# Patient Record
Sex: FEMALE | Race: WHITE | Employment: UNEMPLOYED | URBAN - NONMETROPOLITAN AREA
[De-identification: names, ages, dates, MRNs, and addresses within clinical notes are randomized per-mention and may not be internally consistent; named-entity substitution may affect disease eponyms.]

---

## 2020-11-02 ENCOUNTER — HOSPITAL ENCOUNTER (OUTPATIENT)
Dept: PHYSICAL THERAPY | Age: 30
Setting detail: THERAPIES SERIES
Discharge: HOME | End: 2020-11-02
Payer: COMMERCIAL

## 2020-11-02 PROCEDURE — 97035 APP MDLTY 1+ULTRASOUND EA 15: CPT

## 2020-11-02 PROCEDURE — G0283 ELEC STIM OTHER THAN WOUND: HCPCS

## 2020-11-02 PROCEDURE — 97161 PT EVAL LOW COMPLEX 20 MIN: CPT

## 2020-11-02 PROCEDURE — 97140 MANUAL THERAPY 1/> REGIONS: CPT

## 2020-11-02 NOTE — PROGRESS NOTES
Phone: Sandra           Fax: [phone]                           Outpatient Physical Therapy                                                                            Daily Note    Patient: Karis Nicole : 1990  CSN #: [id number]   Referring Practitioner:  Dr. 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 This patient has no significant PMHx however links her pain to the birth of her 2nd child. She had a very difficult birth and had multiple and very involved tears that required stitching. She states that following the delivery, the next day while she was still in the hospital they also found she still had some of the placenta in her belly that required removal. Then only months later she had her tubal and gall bladder removed. Pt states that the pain never leaves her and it ranges from 1 to 7/10. Pt always has the sensation of pressure and discomfort. Pt was referred for this issue and she was prescribed a topical medication which she started and a birth control to help with her menses. Pt notes that she has pain with attempt of any medical exam, tampon insertion, or sexual intercourse. Pt states she has pain with penetration as well as during. Pt also notes that with tampon insertion she has pain initially but also discomfort as it is in place. Pt states that during sex, she feels like her  is \"hitting a wall. 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 Pt has significant pain that interferes with daily activities. Score on Pain Disability Index is 27 at evaluation. Pt sybil LE ROM is limited and pt does note pain at end ranges. Due to parasympathetic overdrive and pain limiting, this patient is unable to tolerate palpation/assessment of the 3rd layer. Activity Tolerance   Good    Patient Education  Patient Education: Pt was educated in purchase of dilators and a current HEP. Also discussed PT POC  Pt verbalized/demonstrated good understanding:     [x] Yes         [] No, pt required further clarification. Barriers to Learning: none    Post Treatment Pain:  4-5/10      Plan  Times per week: 1  Plan weeks: 6      Goals  (Total # of Visits to Date: 1)      Short term goals  Time Frame for Short term goals: 3 weeks  Short term goal 1: Initiate HEP and progress as tolerated  Short term goal 2: Decrease pain with basic ADLs and mobility to better tolerate care for her children and household chores. Short term goal 3: Pt will initiate purchase of dilator set for HEP    Long term goals  Time Frame for Long term goals : 12 visits  Long term goal 1: Pt will report compliance with her HEP and be independent in progression  Long term goal 2: Pt will report at least a 50% reduction in pain and improved tolerance to sexual intercourse and medical exam.  Long term goal 3: Pt will be able to participate in recreational activites without an increase in baseline pain.     Minutes Tracking:  Time In: 1330  Time Out: [address] [address] 200  Minutes: 2200 [employer]  PT, DPT             Date: 11/2/2020

## 2020-11-02 NOTE — PLAN OF CARE
[facility]           Phone: [phone]             Outpatient Physical Therapy  Fax: [phone]                                           Date: 2020  Patient: Myra Ewing : 1990 CSN #: [id number]   Referring Practitioner:  Dr. 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 Pt has significant pain that interferes with daily activities. Score on Pain Disability Index is 27 at evaluation. Pt sybil LE ROM is limited and pt does note pain at end ranges. Due to parasympathetic overdrive and pain limiting, this patient is unable to tolerate palpation/assessment of the 3rd layer. Goals  Short term goals  Time Frame for Short term goals: 3 weeks  Short term goal 1: Initiate HEP and progress as tolerated  Short term goal 2: Decrease pain with basic ADLs and mobility to better tolerate care for her children and household chores. Short term goal 3: Pt will initiate purchase of dilator set for HEP  Long term goals  Time Frame for Long term goals : 12 visits  Long term goal 1: Pt will report compliance with her HEP and be independent in progression  Long term goal 2: Pt will report at least a 50% reduction in pain and improved tolerance to sexual intercourse and medical exam.  Long term goal 3: Pt will be able to participate in recreational activites without an increase in baseline pain.      Prognosis  Prognosis: Good    Treatment Plan   Times per week: 1  Plan weeks: 6  [x] HP/CP [x] Electrical Stim   [x] Therapeutic Exercise      [] Gait Training  [] Aquatics   [x] Ultrasound         [x] Patient Education/HEP   [x] Manual Therapy  [] Traction    [x] Neuro-deandre        [x] Soft Tissue Mobs            [] Home TENS  [] Iontophoresis    [] Orthotic casting/fitting      [] Dry Needling             Electronically signed by: Syeda Watkins PT, DPT    Date: 11/2/2020      ______________________________________ Date: 11/2/2020   Physician Signature

## 2020-11-02 NOTE — PROGRESS NOTES
Phone: [address] [facility]          Fax: [phone]                      Outpatient Physical Therapy                                                                      Evaluation  Date: 2020  Patient: Vale Gross  : 1990  CSN #: [id number]  Referring Practitioner: Dr. 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 This patient has no significant PMHx however links her pain to the birth of her 2nd child. She had a very difficult birth and had multiple and very involved tears that required stitching. She states that following the delivery, the next day while she was still in the hospital they also found she still had some of the placenta in her belly that required removal. Then only months later she had her tubal and gall bladder removed. Pt states that the pain never leaves her and it ranges from 1 to 7/10. Pt always has the sensation of pressure and discomfort. Pt was referred for this issue and she was prescribed a topical medication which she started and a birth control to help with her menses. Pt notes that she has pain with attempt of any medical exam, tampon insertion, or sexual intercourse. Pt states she has pain with penetration as well as during. Pt also notes that with tampon insertion she has pain initially but also discomfort as it is in place. Pt states that during sex, she feels like her  is \"hitting a wall. \"  Comments: Score on Pain Disability Index is 27 on Evaluation, 2020  Additional Pertinent Hx: Irritable Bowel Syndrome     Objective Observation/Palpation  Posture: Good    RLE General PROM: SLR 60 degrees, ER 40 degrees (+) pain lateral hip, IR 24 degrees (+) pain lateral hip, IT band WFL but (+) discomfort, hip flexor in prone (+) pain at hip flexor attachment  LLE General PROM: SLR 65 degrees, ER 51 degrees (+) pain lateral hip, IR 29 degrees (+) pain lateral hip, IT band WFL but (+) discomfort, hip flexor in prone (+) pain at hip flexor attachment    Functional Outcome Measures  See general comments    Assessment  Assessment: Pt was referred for Vulvodynia (N94.819), Myofascial Pain (M79.18) and PMHx of IBS. Pt has significant pain that interferes with daily activities. Score on Pain Disability Index is 27 at evaluation. Pt sybil LE ROM is limited and pt does note pain at end ranges. Due to parasympathetic overdrive and pain limiting, this patient is unable to tolerate palpation/assessment of the 3rd layer. Prognosis: Good        Decision Making: Low Complexity    Patient Education  Patient Education: Pt was educated in purchase of dilators and a current HEP. Also discussed PT POC  Pt verbalized/demonstrated good understanding:     [X] Yes         [] No, pt required further clarification. Goals  Short term goals  Time Frame for Short term goals: 3 weeks  Short term goal 1: Initiate HEP and progress as tolerated  Short term goal 2: Decrease pain with basic ADLs and mobility to better tolerate care for her children and household chores. Short term goal 3: Pt will initiate purchase of dilator set for HEP    Long term goals  Time Frame for Long term goals : 12 visits  Long term goal 1: Pt will report compliance with her HEP and be independent in progression  Long term goal 2: Pt will report at least a 50% reduction in pain and improved tolerance to sexual intercourse and medical exam.  Long term goal 3: Pt will be able to participate in recreational activites without an increase in baseline pain.       Patient goals : decrease pain and improve quality of life with her         Minutes Tracking:  Time In: 1330  Time Out: [address] [address] 200  Minutes: [address], PT, DPT                11/2/2020

## 2020-11-11 ENCOUNTER — HOSPITAL ENCOUNTER (OUTPATIENT)
Dept: PHYSICAL THERAPY | Age: 30
Setting detail: THERAPIES SERIES
Discharge: HOME | End: 2020-11-11
Payer: COMMERCIAL

## 2020-11-11 PROCEDURE — 97140 MANUAL THERAPY 1/> REGIONS: CPT

## 2020-11-11 PROCEDURE — G0283 ELEC STIM OTHER THAN WOUND: HCPCS

## 2020-11-11 PROCEDURE — 97035 APP MDLTY 1+ULTRASOUND EA 15: CPT

## 2020-11-11 NOTE — PROGRESS NOTES
Education  Patient Education: Pt given instructions on Piriformis and glut massage  Pt verbalized/demonstrated good understanding:     [x] Yes         [] No, pt required further clarification. Post Treatment Pain:  4/10      Plan  Times per week: 1  Plan weeks: 6      Goals  (Total # of Visits to Date: 2)      Short term goals  Time Frame for Short term goals: 3 weeks  Short term goal 1: Initiate HEP and progress as tolerated (MET)  Short term goal 2: Decrease pain with basic ADLs and mobility to better tolerate care for her children and household chores. Short term goal 3: Pt will initiate purchase of dilator set for HEP  (MET)    Long term goals  Time Frame for Long term goals : 12 visits  Long term goal 1: Pt will report compliance with her HEP and be independent in progression  Long term goal 2: Pt will report at least a 50% reduction in pain and improved tolerance to sexual intercourse and medical exam.  Long term goal 3: Pt will be able to participate in recreational activites without an increase in baseline pain.     Minutes Tracking:  Time In: 9255  Time Out: 189 ERYN Soria  Minutes: 78  Timed Code Treatment Minutes: 78 205 Lukasz Soria  PT, DPT      Date: 11/11/2020

## 2020-11-25 ENCOUNTER — HOSPITAL ENCOUNTER (OUTPATIENT)
Dept: PHYSICAL THERAPY | Age: 30
Setting detail: THERAPIES SERIES
Discharge: HOME | End: 2020-11-25
Payer: COMMERCIAL

## 2020-11-25 PROCEDURE — G0283 ELEC STIM OTHER THAN WOUND: HCPCS

## 2020-11-25 PROCEDURE — 97140 MANUAL THERAPY 1/> REGIONS: CPT

## 2020-11-25 PROCEDURE — 97035 APP MDLTY 1+ULTRASOUND EA 15: CPT

## 2020-12-09 ENCOUNTER — HOSPITAL ENCOUNTER (OUTPATIENT)
Dept: PHYSICAL THERAPY | Age: 30
Setting detail: THERAPIES SERIES
Discharge: HOME | End: 2020-12-09
Payer: COMMERCIAL

## 2020-12-09 PROCEDURE — 97035 APP MDLTY 1+ULTRASOUND EA 15: CPT

## 2020-12-09 PROCEDURE — 97140 MANUAL THERAPY 1/> REGIONS: CPT

## 2020-12-09 PROCEDURE — G0283 ELEC STIM OTHER THAN WOUND: HCPCS

## 2020-12-14 NOTE — PROGRESS NOTES
Phone: Sandra           Fax: [phone]                           Outpatient Physical Therapy                                                                            Daily Note    Patient: Judieth Sever : 1990  CSN #: [id number]   Referring Practitioner:  Dr. 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 She was actually surprised and her and her  were able to have intercourse. The pain was still there but was not as intense. Pain today is 2 to 3/10 and she feels 50% improved. Exercises:   HEP    Manual:  Other: Performed cupping to lower abdomen, low back, gluts, hamstrings, statically. IDN is not being utilized due to intolerance. Have informed the patient that as tolerated will also begin dynamic cupping. Modalities:  Moist heat: x15 min for pain and discomfort of PFM  Ultrasound: 1.5 W/cm2, 1 MHz, x8 min to external genitalia to decrease muscular tension  E-stim (parameters): IFC x15 min for pain and discomfort of PFM       Assessment  Assessment: Pt was able to tolerate digital release and palpation of the 1st and 2nd layer moving toward layer 3. Pt noted more tolerance also. Pt had decreased sensitivity, min to mod vs mod prior. Activity Tolerance  Activity Tolerance: Patient Tolerated treatment well    Patient Education  Patient Education: Discussed advancing to dynamic cupping. Pt understood  Pt verbalized/demonstrated good understanding:     [x] Yes         [] No, pt required further clarification.        Post Treatment Pain:  2-3/10      Plan  Times per week: 1  Plan weeks: 6      Goals  (Total # of Visits to Date: 4)      Short term goals  Time Frame for Short term goals: 3 weeks  Short term goal 1: Initiate HEP and progress as tolerated (MET)  Short term goal 2: Decrease pain with basic ADLs and mobility to better tolerate care for her children and household chores. Short term goal 3: Pt will initiate purchase of dilator set for HEP (MET)    Long term goals  Time Frame for Long term goals : 12 visits  Long term goal 1: Pt will report compliance with her HEP and be independent in progression  Long term goal 2: Pt will report at least a 50% reduction in pain and improved tolerance to sexual intercourse and medical exam.  Long term goal 3: Pt will be able to participate in recreational activites without an increase in baseline pain.     Minutes Tracking:  Time In: 0815  Time Out: [address] [facility]  Minutes: 73  Timed Code Treatment Minutes: 73 205 Lukasz Soria  PT, DPT       Date: 12/9/2020

## 2020-12-16 ENCOUNTER — APPOINTMENT (OUTPATIENT)
Dept: PHYSICAL THERAPY | Age: 30
End: 2020-12-16
Payer: COMMERCIAL

## 2020-12-23 ENCOUNTER — HOSPITAL ENCOUNTER (OUTPATIENT)
Dept: PHYSICAL THERAPY | Age: 30
Setting detail: THERAPIES SERIES
Discharge: HOME | End: 2020-12-23
Payer: COMMERCIAL

## 2020-12-23 PROCEDURE — 97035 APP MDLTY 1+ULTRASOUND EA 15: CPT

## 2020-12-23 PROCEDURE — 97140 MANUAL THERAPY 1/> REGIONS: CPT

## 2020-12-23 PROCEDURE — G0283 ELEC STIM OTHER THAN WOUND: HCPCS

## 2020-12-31 NOTE — PROGRESS NOTES
Phone: Sandra           Fax: [phone]                           Outpatient Physical Therapy                                                                            Daily Note    Patient: Danette Mckeon : 1990  CSN #: [id number]   Referring Practitioner:  Dr. 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 Pt notes that prior to therapy as soon as she sat, she felt the pressure and pain. Pt pain level today is 2 to 3/10. Exercises:   HEP    Manual:  Soft Tissue Mobalization: Thermoprobe to gluts/LB/HS and abdomen for tissue tightness and pain, sybil  Other: Performed cupping to lower abdomen, low back, gluts, hamstrings, statically. IDN is not being utilized due to intolerance. Have informed the patient that as tolerated will also begin dynamic cupping. Modalities:  Moist heat: x15 min for pain and discomfort of PFM  Ultrasound: 1.5 W/cm2, 1 MHz, x8 min to external genitalia to decrease muscular tension  E-stim (parameters): IFC x15 min for pain and discomfort of PFM       Assessment  Assessment: Pt compliance will be discussed next visit to determine if she would prefer to purchase the dilator set and decrease frequency to every other week. Pt notes that she is able to tolerate digital palpation to layer 1 to layer 2/3. Pt was unable to tolerate layer 3 fully but is much less guarded. Pt reports continued compliancy with HEP. Palpable tenderness has decreased by at least 50%.  Will continue to progress as tolerated. Activity Tolerance   Improved    Patient Education  Patient Education: Discussed advancing to dynamic cupping. Pt understood but continues to be unable to tolerate at this time. Pt verbalized/demonstrated good understanding:     [x] Yes         [] No, pt required further clarification. Post Treatment Pain:  3/10      Plan  Times per week: 1  Plan weeks: 6      Goals  (Total # of Visits to Date: 5)      Short term goals  Time Frame for Short term goals: 3 weeks  Short term goal 1: Initiate HEP and progress as tolerated (MET)  Short term goal 2: Decrease pain with basic ADLs and mobility to better tolerate care for her children and household chores. (MET)  Short term goal 3: Pt will initiate purchase of dilator set for HEP (MET)    Long term goals  Time Frame for Long term goals : 12 visits  Long term goal 1: Pt will report compliance with her HEP and be independent in progression  Long term goal 2: Pt will report at least a 50% reduction in pain and improved tolerance to sexual intercourse and medical exam.  Long term goal 3: Pt will be able to participate in recreational activites without an increase in baseline pain.     Minutes Tracking:  Time In: 0813  Time Out: 40 [facility]  Minutes: 69  Timed Code Treatment Minutes: [address] [facility]  PT, DPT                  Date: 12/23/2020

## 2020-12-31 NOTE — PLAN OF CARE
[facility]           Phone: [phone]             Outpatient Physical Therapy  Fax: [phone]                                           Date: 2020  Patient: Savita Bonilla : 1990 CSN #: [id number]   Referring Practitioner:  Dr. Maria Del Carmen Germain Referral Date:  10/28/20       [] Plan of Care   [x] Updated Plan of Care    Dates of Service to Include: 2020 to 21    Diagnosis:  Vulvodynia (N94.819), Myofascial Pain (M79.18)    Rehab (Treatment) Diagnosis:  PFM pain             Onset Date:  20    Attendance  Total # of Visits to Date: 5 No Show: 0 Canceled Appointment: 0    Assessment  Assessment: Pt compliance will be discussed next visit to determine if she would prefer to purchase the dilator set and decrease frequency to every other week. Pt notes that she is able to tolerate digital palpation to layer 1 to layer 2/3. Pt was unable to tolerate layer 3 fully but is much less guarded. Pt reports continued compliancy with HEP. Palpable tenderness has decreased by at least 50%. Will continue to progress as tolerated. Goals  Short term goals  Time Frame for Short term goals: 3 weeks  Short term goal 1: Initiate HEP and progress as tolerated (MET)  Short term goal 2: Decrease pain with basic ADLs and mobility to better tolerate care for her children and household chores. (MET)  Short term goal 3: Pt will initiate purchase of dilator set for HEP (MET)  Long term goals  Time Frame for Long term goals : 12 visits  Long term goal 1: Pt will report compliance with her HEP and be independent in progression  Long term goal 2: Pt will report at least a 50% reduction in pain and improved tolerance to sexual intercourse and medical exam.  Long term goal 3: Pt will be able to participate in recreational activites without an increase in baseline pain.      Prognosis  Prognosis: Good    Treatment Plan Times per week: 1  Plan weeks: 6  [x] HP/CP      [x] Electrical Stim   [x] Therapeutic Exercise      [] Gait Training  [] Aquatics   [x] Ultrasound         [x] Patient Education/HEP   [x] Manual Therapy  [] Traction    [x] Neuro-deandre        [x] Soft Tissue Mobs            [] Home TENS  [] Iontophoresis    [] Orthotic casting/fitting      [x] Dry Needling             Electronically signed by: Natalie Freitas PT, DPT    Date: 12/23/2020      ______________________________________ Date: 12/23/2020   Physician Signature

## 2021-12-02 NOTE — DISCHARGE SUMMARY
Phone: Sandra          Fax: [phone]                            Outpatient Physical Therapy                                                                    Discharge Summary    Patient: Misti Huertas  : 1990  CSN #: [id number]   Referring physician: No admitting provider for patient encounter. Referring Practitioner: Dr. Rachel Dawson      Diagnosis: Vulvodynia (F26.751), Myofascial Pain (M79.18)      Date Treatment Initiated: 2020  Date of Last Treatment: 2020      PT Visit Information  Onset Date: 20  PT Insurance Information: Medical [place]  Total # of Visits Approved: 12  Total # of Visits to Date: 5  Plan of Care/Certification Expiration Date: 21  No Show: 0  Canceled Appointment: 1      Frequency/Duration  1 times per week  6 weeks      Treatment Received  [x] HP/CP      [x] Electrical Stim   [x] Therapeutic Exercise      [] Gait Training  [] Aquatics   [x] Ultrasound         [x] Patient Education/HEP   [x] Manual Therapy  [] Traction    [x] Neuro-deandre        [x] Soft Tissue Mobs            [] Home TENS  [] Iontophoresis    [] Orthotic casting/fitting      [] Dry Needling    Assessment  Assessment: Patient attended 5 PT visits for pelvic floor pain and met goals for initiating HEP and for improved pain and purchase of a dilator set. Patient then cancelled remaining visit and did not return for further PT. Will dc patient at this time d/t optimal function reached.        Goals  Short term goals  Time Frame for Short term goals: 3 weeks  Short term goal 1: Initiate HEP and progress as tolerated (MET)  Short term goal 2: Decrease pain with basic ADLs and mobility to better tolerate care for her children and household chores. (MET)  Short term goal 3: Pt will initiate purchase of dilator set for HEP (MET)    Long term goals  Time Frame for Long term goals : 12 visits  Long term goal 1: Pt will report compliance with her HEP and be independent in progression  Long term goal 2: Pt will report at least a 50% reduction in pain and improved tolerance to sexual intercourse and medical exam.  Long term goal 3: Pt will be able to participate in recreational activites without an increase in baseline pain.       Reason for Discharge  [] Goals Achieved                        []  Poor Follow Through/Attendance                  [x]  Optimal Function Achieved     [x]  Patient Discharged Self    []  Hospitalization                         []  Physician discharge      Thank you for this referral      Eugenia Dotson PT, DPT               Date: 12/2/2021